# Patient Record
Sex: FEMALE | Race: WHITE | NOT HISPANIC OR LATINO | Employment: OTHER | URBAN - METROPOLITAN AREA
[De-identification: names, ages, dates, MRNs, and addresses within clinical notes are randomized per-mention and may not be internally consistent; named-entity substitution may affect disease eponyms.]

---

## 2017-01-19 ENCOUNTER — TELEPHONE (OUTPATIENT)
Dept: FAMILY MEDICINE | Facility: CLINIC | Age: 77
End: 2017-01-19

## 2017-01-19 RX ORDER — THEOPHYLLINE 200 MG/1
200 TABLET, EXTENDED RELEASE ORAL 2 TIMES DAILY
Qty: 60 TABLET | Refills: 11 | Status: SHIPPED | OUTPATIENT
Start: 2017-01-19 | End: 2017-04-10

## 2017-01-19 NOTE — TELEPHONE ENCOUNTER
Tried calling [facility] but was on hold over 10 mins.    The prescription that was escribed is for Theophylline 400 mg capsule and the directions say to take 1 twice a day???

## 2017-01-19 NOTE — TELEPHONE ENCOUNTER
----- Message from Denise Ye sent at 1/19/2017 12:20 PM CST -----  Contact: Ary  Needs to verify prescription for Theophylline. Please call back at

## 2017-02-20 ENCOUNTER — TELEPHONE (OUTPATIENT)
Dept: FAMILY MEDICINE | Facility: CLINIC | Age: 77
End: 2017-02-20

## 2017-02-20 NOTE — TELEPHONE ENCOUNTER
----- Message from Sergio Parmar sent at 2/20/2017 11:01 AM CST -----  Contact: Granddaughter- Renzo pena  States she gave grandmother a shower today and immediately afterwards she had to use restroom. States she had a good bit of bright red blood. Wants to be seen today in clinic by whoever is available for today. Call [phone].thanks!

## 2017-02-21 ENCOUNTER — DOCUMENTATION ONLY (OUTPATIENT)
Dept: FAMILY MEDICINE | Facility: CLINIC | Age: 77
End: 2017-02-21

## 2017-02-21 ENCOUNTER — TELEPHONE (OUTPATIENT)
Dept: FAMILY MEDICINE | Facility: CLINIC | Age: 77
End: 2017-02-21

## 2017-02-21 NOTE — PROGRESS NOTES
Pre-Visit Chart Review  For Appointment Scheduled on 2/22/17    Health Maintenance Due   Topic Date Due    TETANUS VACCINE  02/11/1957    DEXA SCAN  02/11/1979    Zoster Vaccine  02/11/1999    Influenza Vaccine  08/01/2016    Pneumococcal (65+) (2 of 2 - PCV13) 12/08/2016    Lipid Panel  12/08/2016

## 2017-02-21 NOTE — TELEPHONE ENCOUNTER
LM on patient VM notifying patient that appointment time with Nanette Casas on 2/22/17 is at 2:20 not 2:40. Patient is being seen for multi issues and has extensive medication list so longer appointment time was needed.

## 2017-03-30 ENCOUNTER — DOCUMENTATION ONLY (OUTPATIENT)
Dept: FAMILY MEDICINE | Facility: CLINIC | Age: 77
End: 2017-03-30

## 2017-03-30 NOTE — PROGRESS NOTES
Pre-Visit Chart Review  For Appointment Scheduled on 03/31/2017    Health Maintenance Due   Topic Date Due    TETANUS VACCINE  02/11/1957    DEXA SCAN  02/11/1979    Zoster Vaccine  02/11/1999    Influenza Vaccine  08/01/2016    Pneumococcal (65+) (2 of 2 - PCV13) 12/08/2016    Lipid Panel  12/08/2016

## 2017-03-31 ENCOUNTER — OFFICE VISIT (OUTPATIENT)
Dept: FAMILY MEDICINE | Facility: CLINIC | Age: 77
End: 2017-03-31
Payer: MEDICARE

## 2017-03-31 ENCOUNTER — LAB VISIT (OUTPATIENT)
Dept: LAB | Facility: HOSPITAL | Age: 77
End: 2017-03-31
Attending: FAMILY MEDICINE
Payer: MEDICARE

## 2017-03-31 VITALS
HEIGHT: 60 IN | SYSTOLIC BLOOD PRESSURE: 143 MMHG | WEIGHT: 190.69 LBS | DIASTOLIC BLOOD PRESSURE: 62 MMHG | BODY MASS INDEX: 37.44 KG/M2 | TEMPERATURE: 98 F | HEART RATE: 61 BPM

## 2017-03-31 DIAGNOSIS — E78.5 HYPERLIPIDEMIA, UNSPECIFIED HYPERLIPIDEMIA TYPE: ICD-10-CM

## 2017-03-31 DIAGNOSIS — R60.0 PERIPHERAL EDEMA: ICD-10-CM

## 2017-03-31 DIAGNOSIS — R60.0 PERIPHERAL EDEMA: Primary | ICD-10-CM

## 2017-03-31 DIAGNOSIS — I10 ESSENTIAL HYPERTENSION: ICD-10-CM

## 2017-03-31 LAB
ALBUMIN SERPL BCP-MCNC: 2.9 G/DL
ALP SERPL-CCNC: 91 U/L
ALT SERPL W/O P-5'-P-CCNC: 8 U/L
ANION GAP SERPL CALC-SCNC: 10 MMOL/L
AST SERPL-CCNC: 14 U/L
BASOPHILS # BLD AUTO: 0.03 K/UL
BASOPHILS NFR BLD: 0.3 %
BILIRUB SERPL-MCNC: 0.3 MG/DL
BNP SERPL-MCNC: 68 PG/ML
BUN SERPL-MCNC: 14 MG/DL
CALCIUM SERPL-MCNC: 8.7 MG/DL
CHLORIDE SERPL-SCNC: 105 MMOL/L
CHOLEST/HDLC SERPL: 3.9 {RATIO}
CO2 SERPL-SCNC: 23 MMOL/L
CREAT SERPL-MCNC: 1 MG/DL
DIFFERENTIAL METHOD: ABNORMAL
EOSINOPHIL # BLD AUTO: 0.5 K/UL
EOSINOPHIL NFR BLD: 4.7 %
ERYTHROCYTE [DISTWIDTH] IN BLOOD BY AUTOMATED COUNT: 13.4 %
EST. GFR  (AFRICAN AMERICAN): >60 ML/MIN/1.73 M^2
EST. GFR  (NON AFRICAN AMERICAN): 54.1 ML/MIN/1.73 M^2
GLUCOSE SERPL-MCNC: 146 MG/DL
HCT VFR BLD AUTO: 38 %
HDL/CHOLESTEROL RATIO: 25.5 %
HDLC SERPL-MCNC: 161 MG/DL
HDLC SERPL-MCNC: 41 MG/DL
HGB BLD-MCNC: 11.8 G/DL
LDLC SERPL CALC-MCNC: 92.6 MG/DL
LYMPHOCYTES # BLD AUTO: 2.2 K/UL
LYMPHOCYTES NFR BLD: 22.7 %
MCH RBC QN AUTO: 27.1 PG
MCHC RBC AUTO-ENTMCNC: 31.1 %
MCV RBC AUTO: 87 FL
MONOCYTES # BLD AUTO: 0.7 K/UL
MONOCYTES NFR BLD: 7.3 %
NEUTROPHILS # BLD AUTO: 6.4 K/UL
NEUTROPHILS NFR BLD: 64.7 %
NONHDLC SERPL-MCNC: 120 MG/DL
PLATELET # BLD AUTO: 299 K/UL
PMV BLD AUTO: 11.4 FL
POTASSIUM SERPL-SCNC: 4.3 MMOL/L
PROT SERPL-MCNC: 7.1 G/DL
RBC # BLD AUTO: 4.36 M/UL
SODIUM SERPL-SCNC: 138 MMOL/L
TRIGL SERPL-MCNC: 137 MG/DL
TSH SERPL DL<=0.005 MIU/L-ACNC: 3.94 UIU/ML
WBC # BLD AUTO: 9.82 K/UL

## 2017-03-31 PROCEDURE — 99213 OFFICE O/P EST LOW 20 MIN: CPT | Mod: S$PBB,,, | Performed by: PHYSICIAN ASSISTANT

## 2017-03-31 PROCEDURE — 84443 ASSAY THYROID STIM HORMONE: CPT

## 2017-03-31 PROCEDURE — 36415 COLL VENOUS BLD VENIPUNCTURE: CPT | Mod: PO

## 2017-03-31 PROCEDURE — 83880 ASSAY OF NATRIURETIC PEPTIDE: CPT

## 2017-03-31 PROCEDURE — 99999 PR PBB SHADOW E&M-EST. PATIENT-LVL IV: CPT | Mod: PBBFAC,,, | Performed by: PHYSICIAN ASSISTANT

## 2017-03-31 PROCEDURE — 80053 COMPREHEN METABOLIC PANEL: CPT

## 2017-03-31 PROCEDURE — 85025 COMPLETE CBC W/AUTO DIFF WBC: CPT

## 2017-03-31 PROCEDURE — 80061 LIPID PANEL: CPT

## 2017-03-31 RX ORDER — HYDROCODONE BITARTRATE AND ACETAMINOPHEN 5; 325 MG/1; MG/1
TABLET ORAL
Refills: 0 | COMMUNITY
Start: 2017-01-03 | End: 2017-06-08 | Stop reason: ALTCHOICE

## 2017-03-31 RX ORDER — HYDROCHLOROTHIAZIDE 12.5 MG/1
TABLET ORAL
Qty: 90 TABLET | Refills: 3 | Status: SHIPPED | OUTPATIENT
Start: 2017-03-31 | End: 2017-06-08 | Stop reason: ALTCHOICE

## 2017-03-31 RX ORDER — HYDROCHLOROTHIAZIDE 12.5 MG/1
12.5 TABLET ORAL DAILY
Qty: 30 TABLET | Refills: 11 | Status: SHIPPED | OUTPATIENT
Start: 2017-03-31 | End: 2017-03-31 | Stop reason: SDUPTHER

## 2017-03-31 NOTE — MR AVS SNAPSHOT
[facility]  [address]  [place] [address]  Phone: [phone]  Fax: [phone]                  Michelle Kovacs   3/31/2017 3:00 PM   Office Visit    Description:  Female : 1940   Provider:  RUFINA Ibarra   Department:  [facility]           Diagnoses this Visit        Comments    Peripheral edema    -  Primary     Essential hypertension         Hyperlipidemia, unspecified hyperlipidemia type                To Do List           Goals (5 Years of Data)     None       These Medications        Disp Refills Start End    hydrochlorothiazide (HYDRODIURIL) 12.5 MG Tab 30 tablet 11 3/31/2017 3/31/2018    Take 1 tablet (12.5 mg total) by mouth once daily. - Oral    Pharmacy: [id number]  [place] - [address] AT [facility] [address] Ph #: [phone]         [facility] On Call     [facility] On Call Nurse Care Line -  Assistance  Unless otherwise directed by your provider, please contact [facility], our nurse care line that is available for  assistance.     Registered nurses in the [facility] On Call Center provide: appointment scheduling, clinical advisement, health education, and other advisory services.  Call: [phone] (toll free)               Medications           Message regarding Medications     Verify the changes and/or additions to your medication regime listed below are the same as discussed with your clinician today.  If any of these changes or additions are incorrect, please notify your healthcare provider.        START taking these NEW medications        Refills    hydrochlorothiazide (HYDRODIURIL) 12.5 MG Tab 11    Sig: Take 1 tablet (12.5 mg total) by mouth once daily.    Class: Normal    Route: Oral           Verify that the below list of medications is an accurate representation of the medications you are currently taking.  If none reported, the list may be blank. If incorrect, please  contact your healthcare provider. Carry this list with you in case of emergency.           Current Medications     alprazolam (XANAX) 0.25 MG tablet Take 0.25 mg by mouth 2 (two) times daily as needed.     amlodipine (NORVASC) 5 MG tablet TAKE ONE TABLET BY MOUTH ONCE DAILY    aspirin (ECOTRIN) 81 MG EC tablet Take 81 mg by mouth once daily.    citalopram (CELEXA) 40 MG tablet Take 40 mg by mouth once daily.      donepezil (ARICEPT) 5 MG tablet Take 5 mg by mouth every evening.    ELIQUIS 5 mg Tab Take 1 tablet by mouth 2 (two) times daily.    fish oil-omega-3 fatty acids 300-1,000 mg capsule Take 1,200 mg by mouth once daily.     loratadine (CLARITIN) 10 mg tablet Take 10 mg by mouth once daily.    losartan (COZAAR) 50 MG tablet TAKE 1 TABLET BY MOUTH EVERY DAY    memantine (NAMENDA) 10 MG Tab Take 1 tablet (10 mg total) by mouth 2 (two) times daily.    metoprolol succinate (TOPROL-XL) 25 MG 24 hr tablet TAKE 1 TABLET BY MOUTH DAILY    omeprazole (PRILOSEC OTC) 20 MG tablet Every day    pravastatin (PRAVACHOL) 40 MG tablet TAKE 1 TABLET(40 MG) BY MOUTH EVERY EVENING    theophylline (THEODUR) 200 MG 12 hr tablet Take 1 tablet (200 mg total) by mouth 2 (two) times daily.    zafirlukast (ACCOLATE) 20 MG tablet Take 1 tablet (20 mg total) by mouth 2 (two) times daily.    hydrochlorothiazide (HYDRODIURIL) 12.5 MG Tab Take 1 tablet (12.5 mg total) by mouth once daily.    hydrocodone-acetaminophen 5-325mg (NORCO) 5-325 mg per tablet TAKE 1 TABLET EVERY 4-6 HOURS AS NEEDED FOR PAIN,DO NOT EXCEED 6 TAB IN 24 HOURS           Clinical Reference Information           Your Vitals Were     BP Pulse Temp    143/62 (BP Location: Left arm, Patient Position: Sitting, BP Method: Automatic) 61 97.8 °F (36.6 °C) (Tympanic)    Height Weight BMI    5' (1.524 m) 86.5 kg (190 lb 11.2 oz) 37.24 kg/m2      Blood Pressure          Most Recent Value    BP  (!)  143/62      Allergies as of 3/31/2017     No Known Drug Allergies       Immunizations Administered on Date of Encounter - 3/31/2017     None      Orders Placed During Today's Visit     Future Labs/Procedures Expected by Expires    Brain natriuretic peptide  3/31/2017 5/30/2018    CBC auto differential  3/31/2017 3/31/2018    Comprehensive metabolic panel  3/31/2017 5/30/2018    Lipid panel  3/31/2017 5/30/2018    TSH  3/31/2017 5/30/2018      Language Assistance Services     ATTENTION: Language assistance services are available, free of charge. Please call [phone].      ATENCIÓN: Si habla español, tiene a branham disposición servicios gratuitos de asistencia lingüística. Llame al [phone].     CHÚ Ý: N?u b?n nói Ti?ng Vi?t, có các d?ch v? h? tr? ngôn ng? mi?n phí dành cho b?n. G?i s? [phone].         [facility] complies with applicable Federal civil rights laws and does not discriminate on the basis of race, color, national origin, age, disability, or sex.

## 2017-04-01 DIAGNOSIS — Z76.0 ENCOUNTER FOR MEDICATION REFILL: ICD-10-CM

## 2017-04-01 DIAGNOSIS — E78.5 HYPERLIPIDEMIA: ICD-10-CM

## 2017-04-01 RX ORDER — ZAFIRLUKAST 20 MG/1
TABLET, FILM COATED ORAL
Qty: 180 TABLET | Refills: 3 | Status: SHIPPED | OUTPATIENT
Start: 2017-04-01

## 2017-04-03 ENCOUNTER — TELEPHONE (OUTPATIENT)
Dept: FAMILY MEDICINE | Facility: CLINIC | Age: 77
End: 2017-04-03

## 2017-04-03 RX ORDER — PRAVASTATIN SODIUM 40 MG/1
TABLET ORAL
Qty: 90 TABLET | Refills: 0 | Status: SHIPPED | OUTPATIENT
Start: 2017-04-03 | End: 2017-07-03 | Stop reason: SDUPTHER

## 2017-04-03 NOTE — PROGRESS NOTES
Subjective:       Patient ID: Michelle Kovacs is a 77 y.o. female.    Chief Complaint: Edema    HPI Comments: Patient presents accompanied by her 2 daughters who report that patient has worsening lower extremity edema for  The past 6 months.  Patient has advanced dementia so is not able to answer my questions appropriately.  Her daughter has noticed a slight dry cough.  Daughter reports echocardiogram 6 months ago with cardiology was normal     Edema   Associated symptoms include congestion and coughing. Pertinent negatives include no abdominal pain, chest pain, fatigue, headaches or nausea.     Review of Systems   Constitutional: Negative for appetite change, fatigue and unexpected weight change.   HENT: Positive for congestion.    Respiratory: Positive for cough. Negative for chest tightness, shortness of breath and wheezing.    Cardiovascular: Positive for leg swelling. Negative for chest pain and palpitations.   Gastrointestinal: Negative for abdominal pain, diarrhea and nausea.   Genitourinary: Negative for decreased urine volume.   Neurological: Negative for dizziness, light-headedness and headaches.       Objective:      Physical Exam   Constitutional: She appears well-developed and well-nourished. She is cooperative. No distress.   Eyes: Conjunctivae and EOM are normal. Pupils are equal, round, and reactive to light. No scleral icterus.   Cardiovascular: Normal rate, regular rhythm and normal heart sounds.    Pulmonary/Chest: Effort normal and breath sounds normal.   Abdominal: Soft. Bowel sounds are normal.   Musculoskeletal:        Right hand: She exhibits normal capillary refill.        Left hand: She exhibits normal capillary refill.        Right lower leg: She exhibits edema (2+).        Left lower leg: She exhibits edema (2+).   Neurological: She is alert.   Skin: Skin is warm and dry.   Psychiatric: She has a normal mood and affect. Her speech is normal. Judgment normal. Cognition and memory  are normal.   Vitals reviewed.      Assessment:       1. Peripheral edema    2. Essential hypertension    3. Hyperlipidemia, unspecified hyperlipidemia type    4. BMI 37.0-37.9, adult        Plan:       Michelle was seen today for edema.    Diagnoses and all orders for this visit:    Peripheral edema  -     Comprehensive metabolic panel; Future  -     TSH; Future  -     CBC auto differential; Future  -     Brain natriuretic peptide; Future  Further recommendations will be made based on results     -  Essential hypertension  Start hctz 12.5 mg qd   Hyperlipidemia, unspecified hyperlipidemia type  -     Lipid panel; Future  Patient readiness: acceptance and barriers:none    During the course of the visit the patient was educated and counseled about the following:     Hypertension:   Medication: start hctz.  Obesity:   Reduce calories     Goals: Hypertension: Reduce Blood Pressure and Obesity: Reduce calorie intake and BMI    Did patient meet goals/outcomes: No    The following self management tools provided: blood pressure log    Patient Instructions (the written plan) was given to the patient/family.     Time spent with patient: 30 minutes

## 2017-04-03 NOTE — TELEPHONE ENCOUNTER
----- Message from RUFINA Ibarra sent at 4/3/2017  3:12 PM CDT -----  Only lab abnormality to account for the swelling is the low albumin   Her kidney function is looking better than it had been   BNP (hear failure marker ) is normal   Continue with HCTZ, compression stocking recommended   Follow up with PCP is needed (3-6 mo)

## 2017-04-10 ENCOUNTER — TELEPHONE (OUTPATIENT)
Dept: FAMILY MEDICINE | Facility: CLINIC | Age: 77
End: 2017-04-10

## 2017-04-10 RX ORDER — THEOPHYLLINE 400 MG/1
200 TABLET, EXTENDED RELEASE ORAL 2 TIMES DAILY
Qty: 15 TABLET | Refills: 11 | Status: SHIPPED | OUTPATIENT
Start: 2017-04-10 | End: 2017-04-10 | Stop reason: SDUPTHER

## 2017-04-10 NOTE — TELEPHONE ENCOUNTER
Left voice message for patient to call back and let us know the name of the medication that she needs refilled.

## 2017-04-10 NOTE — TELEPHONE ENCOUNTER
Patient takes Theophyline 200 mg BID. The 200 mg tab is no longer made. Needs a new rx for theophyline 400 mg tab, take 1/2 tab BID. Thought itt was sent at last visit. Patient is out of med. Please send to [facility] in [place] on [address]

## 2017-04-10 NOTE — TELEPHONE ENCOUNTER
----- Message from Denise Ye sent at 4/10/2017  9:32 AM CDT -----  Contact: daughter  States one of the drugs prescribed was not called in-asthma medicine.  Please call back at     [id number] - [place] - [address] [facility] [address]  [address] [facility] [address]  [place] [address]  Phone: [phone] Fax: [phone]

## 2017-04-11 RX ORDER — THEOPHYLLINE 400 MG/1
TABLET, EXTENDED RELEASE ORAL
Qty: 90 TABLET | Refills: 11 | Status: SHIPPED | OUTPATIENT
Start: 2017-04-11

## 2017-06-08 ENCOUNTER — TELEPHONE (OUTPATIENT)
Dept: FAMILY MEDICINE | Facility: CLINIC | Age: 77
End: 2017-06-08

## 2017-06-08 ENCOUNTER — DOCUMENTATION ONLY (OUTPATIENT)
Dept: FAMILY MEDICINE | Facility: CLINIC | Age: 77
End: 2017-06-08

## 2017-06-08 ENCOUNTER — OFFICE VISIT (OUTPATIENT)
Dept: FAMILY MEDICINE | Facility: CLINIC | Age: 77
End: 2017-06-08
Payer: MEDICARE

## 2017-06-08 VITALS
TEMPERATURE: 98 F | BODY MASS INDEX: 37.83 KG/M2 | RESPIRATION RATE: 20 BRPM | HEART RATE: 65 BPM | DIASTOLIC BLOOD PRESSURE: 69 MMHG | OXYGEN SATURATION: 92 % | SYSTOLIC BLOOD PRESSURE: 150 MMHG | HEIGHT: 60 IN | WEIGHT: 192.69 LBS

## 2017-06-08 DIAGNOSIS — J45.20 MILD INTERMITTENT ASTHMA WITHOUT COMPLICATION: ICD-10-CM

## 2017-06-08 DIAGNOSIS — Z76.0 ENCOUNTER FOR MEDICATION REFILL: ICD-10-CM

## 2017-06-08 DIAGNOSIS — F02.80 ALZHEIMER'S DEMENTIA WITHOUT BEHAVIORAL DISTURBANCE, UNSPECIFIED TIMING OF DEMENTIA ONSET: ICD-10-CM

## 2017-06-08 DIAGNOSIS — G30.9 ALZHEIMER'S DEMENTIA WITHOUT BEHAVIORAL DISTURBANCE, UNSPECIFIED TIMING OF DEMENTIA ONSET: ICD-10-CM

## 2017-06-08 DIAGNOSIS — J45.20 MILD INTERMITTENT ASTHMA WITHOUT COMPLICATION: Primary | ICD-10-CM

## 2017-06-08 DIAGNOSIS — B86 SCABIES: ICD-10-CM

## 2017-06-08 PROCEDURE — 99214 OFFICE O/P EST MOD 30 MIN: CPT | Mod: S$PBB,,, | Performed by: FAMILY MEDICINE

## 2017-06-08 PROCEDURE — 1159F MED LIST DOCD IN RCRD: CPT | Mod: ,,, | Performed by: FAMILY MEDICINE

## 2017-06-08 PROCEDURE — 99213 OFFICE O/P EST LOW 20 MIN: CPT | Mod: PBBFAC,PO | Performed by: FAMILY MEDICINE

## 2017-06-08 PROCEDURE — 99999 PR PBB SHADOW E&M-EST. PATIENT-LVL III: CPT | Mod: PBBFAC,,, | Performed by: FAMILY MEDICINE

## 2017-06-08 PROCEDURE — 1126F AMNT PAIN NOTED NONE PRSNT: CPT | Mod: ,,, | Performed by: FAMILY MEDICINE

## 2017-06-08 PROCEDURE — 1157F ADVNC CARE PLAN IN RCRD: CPT | Mod: ,,, | Performed by: FAMILY MEDICINE

## 2017-06-08 RX ORDER — POTASSIUM CHLORIDE 750 MG/1
CAPSULE, EXTENDED RELEASE ORAL
Refills: 3 | COMMUNITY
Start: 2017-05-22

## 2017-06-08 RX ORDER — FUROSEMIDE 20 MG/1
TABLET ORAL
Refills: 3 | COMMUNITY
Start: 2017-05-22

## 2017-06-08 RX ORDER — MONTELUKAST SODIUM 10 MG/1
TABLET ORAL
Qty: 90 TABLET | Refills: 11 | OUTPATIENT
Start: 2017-06-08

## 2017-06-08 RX ORDER — PREDNISONE 20 MG/1
20 TABLET ORAL DAILY
Qty: 5 TABLET | Refills: 2 | Status: SHIPPED | OUTPATIENT
Start: 2017-06-08 | End: 2017-08-18

## 2017-06-08 RX ORDER — MONTELUKAST SODIUM 10 MG/1
10 TABLET ORAL NIGHTLY
Qty: 30 TABLET | Refills: 11 | Status: SHIPPED | OUTPATIENT
Start: 2017-06-08

## 2017-06-08 RX ORDER — IVERMECTIN 5 MG/G
1 LOTION TOPICAL ONCE
Qty: 117 G | Refills: 1 | Status: SHIPPED | OUTPATIENT
Start: 2017-06-08 | End: 2017-06-08

## 2017-06-08 NOTE — TELEPHONE ENCOUNTER
----- Message from Geraldine Werner sent at 6/8/2017  2:19 PM CDT -----  Patient is requesting a Rx for scabies please call [phone]    [id number] - [place] - [address] AT [facility] [address]  [address]  [place] [address]  Phone: [phone] Fax: [phone]

## 2017-06-08 NOTE — PROGRESS NOTES
Pre-Visit Chart Review  For Appointment Scheduled on 6-8-17    Health Maintenance Due   Topic Date Due    TETANUS VACCINE  02/11/1957    DEXA SCAN  02/11/1979    Zoster Vaccine  02/11/1999    Pneumococcal (65+) (2 of 2 - PCV13) 12/08/2016

## 2017-06-09 NOTE — PROGRESS NOTES
Subjective:       Patient ID: Michelle Kovacs is a 77 y.o. female.    Chief Complaint: Wheezing (exposed to scabies )    77 year old female patient of Dr. Costello presents for evaluation of scabies.  She has no rash but was indirectly and perhaps directly contacted by her caretaker's  who did.  The patient has alzheimers dementia and often overnight stays with her caretaker and the  assists with her frequently.  He was recently found to have scabies and treated, his wife without lesions was treated, and both had contact with the patient who does not currently have a rash but may potentially be infected and reinfect the other two.  In addition she has asthma with occasional flareups previously treated by Dr. Harris.  She would generally receive a short course of prednisone for flareups along with inhalers but as her alzheimers progressed she has been unable to use the inhalers due to loss of cognitive ability.    Past Medical History:  No date: Allergy  No date: Alzheimer disease  No date: Anxiety  No date: Asthma  No date: Depression  No date: Diverticulitis  No date: GERD (gastroesophageal reflux disease)  No date: Glaucoma  No date: Hypertension    Past Surgical History:  No date: HYSTERECTOMY      Comment: Total  No date: TONSILLECTOMY      Current Outpatient Prescriptions:     alprazolam (XANAX) 0.25 MG tablet, Take 0.25 mg by mouth 2 (two) times daily as needed. , Disp: , Rfl:     aspirin (ECOTRIN) 81 MG EC tablet, Take 81 mg by mouth once daily., Disp: , Rfl:     citalopram (CELEXA) 40 MG tablet, Take 40 mg by mouth once daily.  , Disp: , Rfl:     ELIQUIS 5 mg Tab, Take 1 tablet by mouth 2 (two) times daily., Disp: , Rfl: 3    fish oil-omega-3 fatty acids 300-1,000 mg capsule, Take 1,200 mg by mouth once daily. , Disp: , Rfl:     furosemide (LASIX) 20 MG tablet, TK 1 T PO QD  PRF SWELLING, Disp: , Rfl: 3    loratadine (CLARITIN) 10 mg tablet, Take 10 mg by mouth once daily., Disp:  , Rfl:     losartan (COZAAR) 50 MG tablet, TAKE 1 TABLET BY MOUTH EVERY DAY, Disp: 90 tablet, Rfl: 3    memantine (NAMENDA) 10 MG Tab, Take 1 tablet (10 mg total) by mouth 2 (two) times daily., Disp: 180 tablet, Rfl: 3    metoprolol succinate (TOPROL-XL) 25 MG 24 hr tablet, TAKE 1 TABLET BY MOUTH DAILY, Disp: 90 tablet, Rfl: 0    omeprazole (PRILOSEC OTC) 20 MG tablet, Take 20 mg by mouth every morning. Every day, Disp: , Rfl:     potassium chloride (MICRO-K) 10 MEQ CpSR, TK 1 C PO ON THE DAYS YOU TAKE  LASIX, Disp: , Rfl: 3    pravastatin (PRAVACHOL) 40 MG tablet, TAKE 1 TABLET(40 MG) BY MOUTH EVERY NIGHT, Disp: 90 tablet, Rfl: 0    theophylline 400 mg Tb24, TAKE 1/2 TABLET BY MOUTH TWICE DAILY, Disp: 90 tablet, Rfl: 11    zafirlukast (ACCOLATE) 20 MG tablet, TAKE 1 TABLET BY MOUTH TWICE DAILY, Disp: 180 tablet, Rfl: 3        Review of Systems   Unable to perform ROS: Dementia   Constitutional: Negative for chills and fever.   HENT: Positive for congestion, postnasal drip and rhinorrhea. Negative for sinus pressure.    Respiratory: Positive for cough and wheezing (occasional). Negative for chest tightness and shortness of breath.        Objective:      Physical Exam   Constitutional: She appears well-developed. No distress.   Mildly elevated blood pressure     HENT:   Head: Normocephalic and atraumatic.   Right Ear: External ear normal.   Left Ear: External ear normal.   Nose: Mucosal edema and rhinorrhea present. Right sinus exhibits no maxillary sinus tenderness and no frontal sinus tenderness. Left sinus exhibits no maxillary sinus tenderness and no frontal sinus tenderness.   Mouth/Throat: Oropharynx is clear and moist.   Eyes: EOM are normal. Pupils are equal, round, and reactive to light. No scleral icterus.   Neck: Normal range of motion. Neck supple.   Cardiovascular: Normal rate, regular rhythm and normal heart sounds.  Exam reveals no gallop and no friction rub.    No murmur  heard.  Pulmonary/Chest: Effort normal and breath sounds normal. No respiratory distress. She has no wheezes. She has no rales. She exhibits no tenderness.   Skin: Skin is warm and dry. No rash noted. She is not diaphoretic. No erythema.   Nursing note and vitals reviewed.      Assessment:       1. Mild intermittent asthma without complication    2. Encounter for medication refill    3. Scabies    4. Alzheimer's dementia without behavioral disturbance, unspecified timing of dementia onset        Plan:       1. Mild intermittent asthma without complication  Will refill prn prednisone, trial singulair  - predniSONE (DELTASONE) 20 MG tablet; Take 1 tablet (20 mg total) by mouth once daily.  Dispense: 5 tablet; Refill: 2  - montelukast (SINGULAIR) 10 mg tablet; Take 1 tablet (10 mg total) by mouth every evening.  Dispense: 30 tablet; Refill: 11    2. Encounter for medication refill    3. Scabies  No visible rash but some contact and possibility of spread to others.  Will treat with single course of ivermectin  - ivermectin 0.5 % Lotn; Apply 1 application topically once.  Dispense: 117 g; Refill: 1    4. Alzheimer's dementia without behavioral disturbance, unspecified timing of dementia onset  stable

## 2017-06-13 ENCOUNTER — TELEPHONE (OUTPATIENT)
Dept: FAMILY MEDICINE | Facility: CLINIC | Age: 77
End: 2017-06-13

## 2017-06-13 DIAGNOSIS — Z20.7 SCABIES EXPOSURE: Primary | ICD-10-CM

## 2017-06-13 RX ORDER — PERMETHRIN 50 MG/G
CREAM TOPICAL
Qty: 60 G | Refills: 0 | Status: SHIPPED | OUTPATIENT
Start: 2017-06-13

## 2017-06-13 NOTE — TELEPHONE ENCOUNTER
----- Message from Lili Ford sent at 6/13/2017  3:29 PM CDT -----  Patients daughter states that Rx Sclice is too expensive and need Rx Permethrin sent into [facility]/[address].  Any questions call Soraida at [phone].

## 2017-07-03 DIAGNOSIS — E78.5 HYPERLIPIDEMIA: ICD-10-CM

## 2017-07-03 RX ORDER — PRAVASTATIN SODIUM 40 MG/1
TABLET ORAL
Qty: 90 TABLET | Refills: 0 | Status: SHIPPED | OUTPATIENT
Start: 2017-07-03

## 2017-07-31 ENCOUNTER — DOCUMENTATION ONLY (OUTPATIENT)
Dept: FAMILY MEDICINE | Facility: CLINIC | Age: 77
End: 2017-07-31

## 2017-07-31 NOTE — PROGRESS NOTES
Pre-Visit Chart Review  For Appointment Scheduled on 8/1/17    Health Maintenance Due   Topic Date Due    TETANUS VACCINE  02/11/1957    DEXA SCAN  02/11/1979    Zoster Vaccine  02/11/1999    Pneumococcal (65+) (2 of 2 - PCV13) 12/08/2016                 +

## 2017-08-01 ENCOUNTER — OFFICE VISIT (OUTPATIENT)
Dept: FAMILY MEDICINE | Facility: CLINIC | Age: 77
End: 2017-08-01
Payer: MEDICARE

## 2017-08-01 ENCOUNTER — TELEPHONE (OUTPATIENT)
Dept: FAMILY MEDICINE | Facility: CLINIC | Age: 77
End: 2017-08-01

## 2017-08-01 VITALS
HEIGHT: 60 IN | OXYGEN SATURATION: 95 % | DIASTOLIC BLOOD PRESSURE: 70 MMHG | WEIGHT: 191.56 LBS | RESPIRATION RATE: 14 BRPM | SYSTOLIC BLOOD PRESSURE: 140 MMHG | TEMPERATURE: 98 F | BODY MASS INDEX: 37.61 KG/M2 | HEART RATE: 67 BPM

## 2017-08-01 DIAGNOSIS — R05.9 COUGH: Primary | ICD-10-CM

## 2017-08-01 DIAGNOSIS — J01.00 ACUTE NON-RECURRENT MAXILLARY SINUSITIS: ICD-10-CM

## 2017-08-01 PROCEDURE — 1159F MED LIST DOCD IN RCRD: CPT | Mod: ,,, | Performed by: PHYSICIAN ASSISTANT

## 2017-08-01 PROCEDURE — 99999 PR PBB SHADOW E&M-EST. PATIENT-LVL V: CPT | Mod: PBBFAC,,, | Performed by: PHYSICIAN ASSISTANT

## 2017-08-01 PROCEDURE — 1126F AMNT PAIN NOTED NONE PRSNT: CPT | Mod: ,,, | Performed by: PHYSICIAN ASSISTANT

## 2017-08-01 PROCEDURE — 99215 OFFICE O/P EST HI 40 MIN: CPT | Mod: PBBFAC,PO | Performed by: PHYSICIAN ASSISTANT

## 2017-08-01 PROCEDURE — 99213 OFFICE O/P EST LOW 20 MIN: CPT | Mod: S$PBB,,, | Performed by: PHYSICIAN ASSISTANT

## 2017-08-01 PROCEDURE — 1157F ADVNC CARE PLAN IN RCRD: CPT | Mod: ,,, | Performed by: PHYSICIAN ASSISTANT

## 2017-08-01 RX ORDER — AMOXICILLIN AND CLAVULANATE POTASSIUM 875; 125 MG/1; MG/1
1 TABLET, FILM COATED ORAL 2 TIMES DAILY
Qty: 20 TABLET | Refills: 0 | Status: SHIPPED | OUTPATIENT
Start: 2017-08-01 | End: 2017-08-11

## 2017-08-01 RX ORDER — BENZONATATE 100 MG/1
100 CAPSULE ORAL 3 TIMES DAILY PRN
Qty: 30 CAPSULE | Refills: 0 | Status: SHIPPED | OUTPATIENT
Start: 2017-08-01

## 2017-08-01 NOTE — TELEPHONE ENCOUNTER
Appointment scheduled with patient's daughter for Monday, 3/5/17 at 9:00 am and appt letter mailed.

## 2017-08-01 NOTE — TELEPHONE ENCOUNTER
----- Message from Gladis Pavon sent at 8/1/2017  8:41 AM CDT -----  Pt has not seen dr awad since before 2012  Will dr awad still be her pcp ?  Please call daughter shamir @ [phone]  Thanks !

## 2017-08-01 NOTE — PATIENT INSTRUCTIONS
Take antibiotics with food.  Increase fluid intake.  Call the clinic if symptoms worsen, new symptoms develop or if you are not any better after completion of your antibiotics.        Established High Blood Pressure    High blood pressure (hypertension) is a chronic disease. Often health care providers dont know what causes it. But it can be caused by certain health conditions and medicines.  If you have high blood pressure, you may not have any symptoms. If you do have symptoms, they may include headache, dizziness, changes in your vision, chest pain, and shortness of breath. But even without symptoms, high blood pressure thats not treated raises your risk for heart attack and stroke. High blood pressure is a serious health risk and shouldnt be ignored.  A blood pressure reading is made up of two numbers: a higher number over a lower number. The top number is the systolic pressure. The bottom number is the diastolic pressure. A normal blood pressure is less than 120 over less than 80.  High blood pressure is when either the top number is 140 or higher, or the bottom number is 90 or higher. This must be the result when taking your blood pressure a number of times. The blood pressures between normal and high are called prehypertension.  Home care  If you have high blood pressure, you should do what is listed below to lower your blood pressure. If you are taking medicines for high blood pressure, these methods may reduce or end your need for medicines in the future.  · Begin a weight-loss program if you are overweight.  · Cut back on how much salt you get in your diet. Heres how to do this:  ¨ Dont eat foods that have a lot of salt. These include olives, pickles, smoked meats, and salted potato chips.  ¨ Dont add salt to your food at the table.  ¨ Use only small amounts of salt when cooking.  · Begin an exercise program. Talk with your health care provider about the type of exercise program that would be best  for you. It doesn't have to be hard. Even brisk walking for 20 minutes 3 times a week is a good form of exercise.  · Dont take medicines that have heart stimulants. This includes many cold and sinus decongestant pills and sprays, as well as diet pills. Check the warnings about hypertension on the label. Stimulants such as amphetamine or cocaine could be lethal for someone with high blood pressure. Never take these.  · Limit how much caffeine you get in your diet. Switch to caffeine-free products.  · Stop smoking. If you are a long-time smoker, this can be hard. Enroll in a stop-smoking program to make it more likely that you will quit for good.  · Learn how to handle stress. This is an important part of any program to lower blood pressure. Learn about relaxation methods like meditation, yoga, or biofeedback.  · If your provider prescribed medicines, take them exactly as directed. Missing doses may cause your blood pressure get out of control.  · Consider buying an automatic blood pressure machine. You can get one of these at most pharmacies. Use this to watch your blood pressure at home. Give the results to your provider.  Follow-up care  You will need to make regular visits to your health care provider. This is to check your blood pressure and to make changes to your medicines. Make a follow-up appointment as directed.  When to seek medical advice  Call your health care provider right away if any of these occur:  · Chest pain or shortness of breath  · Severe headache  · Throbbing or rushing sound in the ears  · Nosebleed  · Sudden severe pain in your belly (abdomen)  · Extreme drowsiness, confusion, or fainting  · Dizziness or dizziness with a spinning sensation (vertigo)  · Weakness of an arm or leg or one side of the face  · You have problems speaking or seeing   Date Last Reviewed: 11/25/2014  © 3766-0472 [employer]. [address] [place] [address], [place] [address]. All rights reserved. This information  is not intended as a substitute for professional medical care. Always follow your healthcare professional's instructions.            Walking for Fitness  Fitness walking has something for everyone, even people who are already fit. Walking is one of the safest ways to condition your body aerobically. It can boost energy, help you lose weight, and reduce stress.    Physical benefits  · Walking strengthens your heart and lungs, and tones your muscles.  · When walking, your feet land with less impact than in other sports. This reduces chances of muscle, bone, and joint injury.  · Regular walking improves your cholesterol levels and lowers your risk of heart disease. And it helps you control your blood sugar if you have diabetes.  · Walking is a weight-bearing activity, which helps maintain bone density. This can help prevent osteoporosis.  Personal rewards  · Taking walks can help you relax and manage stress. And fitness walking may make you feel better about yourself.  · Walking can help you sleep better at night and make you less likely to be depressed.  · Regular walking may help maintain your memory as you get older.  · Walking is a great way to spend extra time with friends and family members. Be sure to invite your dog along!  Q&A about fitness walking  Q: Will walking keep me fit?  A: Yes. Regular walking at the right pace gives you all the benefits of other aerobic activities, such as jogging and swimming.  Q: Will walking help me lose weight and keep it off?  A: Yes. Per mile, walking can burn as many calories as jogging. Your health care provider can help work walking into your weight-loss plan.  Q: Is walking safe for my health?  A: Yes. Walking is safe if you have high blood pressure, diabetes, heart disease, or other conditions. Talk to your health care provider before you start.  Date Last Reviewed: 5/9/2015  © 9529-5566 [employer]. [address] [place] [address], [place] [address]. All rights  reserved. This information is not intended as a substitute for professional medical care. Always follow your healthcare professional's instructions.            Weight Management: Getting Started  Healthy bodies come in all shapes and sizes. Not all bodies are made to be thin. For some people, a healthy weight is higher than the average weight listed on weight charts. Your healthcare provider can help you decide on a healthy weight for you.    Reasons to lose weight  Losing weight can help with some health problems, such as high blood pressure, heart disease, diabetes, sleep apnea, and arthritis. You may also feel more energy.  Set your long-term goal  Your goal doesn't even have to be a specific weight. You may decide on a fitness goal (such as being able to walk 10 miles a week), or a health goal (such as lowering your blood pressure). Choose a goal that is measurable and reasonable, so you know when you've reached it. A goal of reaching a BMI of less than 25 is not always reasonable (or possible).   Make an action plan  Habits dont change overnight. Setting your goals too high can leave you feeling discouraged if you cant reach them. Be realistic. Choose one or two small changes you can make now. Set an action plan for how you are going to make these changes. When you can stick to this plan, keep making a few more small changes. Taking small steps will help you stay on the path to success.  Track your progress  Write down your goals. Then, keep a daily record of your progress. Write down what you eat and how active you are. This record lets you look back on how much youve done. It may also help when youre feeling frustrated. Reward yourself for success. Even if you dont reach every goal, give yourself credit for what you do get done.  Get support  Encouragement from others can help make losing weight easier. Ask your family members and friends for support. They may even want to join you. Also look to your  healthcare provider, registered dietitian, and  for help. Your local hospital can give you more information about nutrition, exercise, and weight loss.  Date Last Reviewed: 1/31/2016  © 1355-5970 [employer]. [address] [place] [address], [place] [address]. All rights reserved. This information is not intended as a substitute for professional medical care. Always follow your healthcare professional's instructions.

## 2017-08-01 NOTE — PROGRESS NOTES
Subjective:       Patient ID: Michelle Kovacs is a 77 y.o. female.    Chief Complaint: Cough    Cough   This is a new problem. The current episode started 1 to 4 weeks ago. The problem has been gradually worsening. The cough is productive of purulent sputum. Associated symptoms include nasal congestion, postnasal drip, rhinorrhea and a sore throat. Pertinent negatives include no chest pain, chills, ear pain, fever, headaches, heartburn, shortness of breath, weight loss or wheezing. Nothing aggravates the symptoms. Treatments tried: prednisone x 5 days. The treatment provided no relief. Her past medical history is significant for asthma.   Sinusitis   This is a new problem. The current episode started 1 to 4 weeks ago. The problem has been gradually worsening since onset. There has been no fever. The pain is moderate. Associated symptoms include congestion, coughing, sinus pressure and a sore throat. Pertinent negatives include no chills, ear pain, headaches or shortness of breath. Treatments tried: prednisone x 5 days. The treatment provided no relief.     Review of Systems   Constitutional: Negative for activity change, appetite change, chills, fever and weight loss.   HENT: Positive for congestion, postnasal drip, rhinorrhea, sinus pressure and sore throat. Negative for ear pain.    Eyes: Negative for visual disturbance.   Respiratory: Positive for cough. Negative for shortness of breath and wheezing.    Cardiovascular: Negative for chest pain.   Gastrointestinal: Negative for abdominal distention, abdominal pain and heartburn.   Genitourinary: Negative for difficulty urinating.   Neurological: Negative for headaches.   Hematological: Negative for adenopathy.   Psychiatric/Behavioral: The patient is not nervous/anxious.        Objective:      Physical Exam   HENT:   Mouth/Throat: No oropharyngeal exudate.   Posterior oropharynx erythematous with post nasal drip present  Maxillary sinuses TTP   Eyes:  Conjunctivae are normal. Pupils are equal, round, and reactive to light.   Cardiovascular: Normal rate and regular rhythm.    Pulmonary/Chest: Effort normal and breath sounds normal. She has no wheezes.   Musculoskeletal: She exhibits no edema.   Lymphadenopathy:     She has no cervical adenopathy.   Neurological: She is alert.   Skin: No erythema.   Psychiatric: Her behavior is normal.       Assessment:       1. Cough    2. Acute non-recurrent maxillary sinusitis        Plan:     Michelle was seen today for cough.    Diagnoses and all orders for this visit:    Cough  -     benzonatate (TESSALON) 100 MG capsule; Take 1 capsule (100 mg total) by mouth 3 (three) times daily as needed for Cough.    Acute non-recurrent maxillary sinusitis  -     amoxicillin-clavulanate 875-125mg (AUGMENTIN) 875-125 mg per tablet; Take 1 tablet by mouth 2 (two) times daily.    Take antibiotics with food.  Increase fluid intake.  Call the clinic if symptoms worsen, new symptoms develop or if you are not any better after completion of your antibiotics.    If no improvement after antibiotics completed, contact clinic so CXR can be ordered.

## 2017-08-17 ENCOUNTER — TELEPHONE (OUTPATIENT)
Dept: FAMILY MEDICINE | Facility: CLINIC | Age: 77
End: 2017-08-17

## 2017-08-17 NOTE — TELEPHONE ENCOUNTER
----- Message from Bonita Martinez sent at 8/17/2017  3:54 PM CDT -----  Contact: Daughter Soraida Flores [phone]  She fell at home and now has a large hematoma on her forearm.  She would like to bring her mom in for an xray to be sure it is OK.  Please call her.  Thank you!

## 2017-08-18 ENCOUNTER — TELEPHONE (OUTPATIENT)
Dept: FAMILY MEDICINE | Facility: CLINIC | Age: 77
End: 2017-08-18

## 2017-08-18 ENCOUNTER — HOSPITAL ENCOUNTER (OUTPATIENT)
Dept: RADIOLOGY | Facility: CLINIC | Age: 77
Discharge: HOME | End: 2017-08-18
Attending: PHYSICIAN ASSISTANT
Payer: MEDICARE

## 2017-08-18 ENCOUNTER — OFFICE VISIT (OUTPATIENT)
Dept: FAMILY MEDICINE | Facility: CLINIC | Age: 77
End: 2017-08-18
Payer: MEDICARE

## 2017-08-18 ENCOUNTER — DOCUMENTATION ONLY (OUTPATIENT)
Dept: FAMILY MEDICINE | Facility: CLINIC | Age: 77
End: 2017-08-18

## 2017-08-18 VITALS
WEIGHT: 192.44 LBS | BODY MASS INDEX: 37.78 KG/M2 | SYSTOLIC BLOOD PRESSURE: 138 MMHG | TEMPERATURE: 98 F | DIASTOLIC BLOOD PRESSURE: 88 MMHG | HEART RATE: 71 BPM | HEIGHT: 60 IN | OXYGEN SATURATION: 94 %

## 2017-08-18 DIAGNOSIS — T14.8XXA HEMATOMA: ICD-10-CM
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
## 2017-08-18 DIAGNOSIS — M79.601 PAIN OF RIGHT UPPER EXTREMITY: ICD-10-CM

## 2017-08-18 DIAGNOSIS — R06.2 WHEEZING: ICD-10-CM

## 2017-08-18 DIAGNOSIS — J45.909 ASTHMA, NOT WELL CONTROLLED, UNSPECIFIED ASTHMA SEVERITY, UNCOMPLICATED: Primary | ICD-10-CM

## 2017-08-18 DIAGNOSIS — R05.9 COUGH: ICD-10-CM

## 2017-08-18 PROCEDURE — 71020 XR CHEST PA AND LATERAL: CPT | Mod: TC,PO

## 2017-08-18 PROCEDURE — 3075F SYST BP GE 130 - 139MM HG: CPT | Mod: ,,, | Performed by: PHYSICIAN ASSISTANT

## 2017-08-18 PROCEDURE — 73090 X-RAY EXAM OF FOREARM: CPT | Mod: 26,RT,S$GLB, | Performed by: RADIOLOGY

## 2017-08-18 PROCEDURE — 73110 X-RAY EXAM OF WRIST: CPT | Mod: 26,RT,S$GLB, | Performed by: RADIOLOGY

## 2017-08-18 PROCEDURE — 3079F DIAST BP 80-89 MM HG: CPT | Mod: ,,, | Performed by: PHYSICIAN ASSISTANT

## 2017-08-18 PROCEDURE — 73110 X-RAY EXAM OF WRIST: CPT | Mod: TC,PO,RT

## 2017-08-18 PROCEDURE — 1159F MED LIST DOCD IN RCRD: CPT | Mod: ,,, | Performed by: PHYSICIAN ASSISTANT

## 2017-08-18 PROCEDURE — 71020 XR CHEST PA AND LATERAL: CPT | Mod: 26,,, | Performed by: RADIOLOGY

## 2017-08-18 PROCEDURE — 99999 PR PBB SHADOW E&M-EST. PATIENT-LVL III: CPT | Mod: PBBFAC,,, | Performed by: PHYSICIAN ASSISTANT

## 2017-08-18 PROCEDURE — 73090 X-RAY EXAM OF FOREARM: CPT | Mod: TC,PO,RT

## 2017-08-18 PROCEDURE — 99213 OFFICE O/P EST LOW 20 MIN: CPT | Mod: PBBFAC,PO,25 | Performed by: PHYSICIAN ASSISTANT

## 2017-08-18 PROCEDURE — 1126F AMNT PAIN NOTED NONE PRSNT: CPT | Mod: ,,, | Performed by: PHYSICIAN ASSISTANT

## 2017-08-18 PROCEDURE — 1157F ADVNC CARE PLAN IN RCRD: CPT | Mod: ,,, | Performed by: PHYSICIAN ASSISTANT

## 2017-08-18 PROCEDURE — 99214 OFFICE O/P EST MOD 30 MIN: CPT | Mod: S$PBB,,, | Performed by: PHYSICIAN ASSISTANT

## 2017-08-18 RX ORDER — PREDNISONE 10 MG/1
10 TABLET ORAL 2 TIMES DAILY
Qty: 10 TABLET | Refills: 0 | Status: SHIPPED | OUTPATIENT
Start: 2017-08-18 | End: 2017-08-23

## 2017-08-18 RX ORDER — LEVALBUTEROL INHALATION SOLUTION 1.25 MG/3ML
SOLUTION RESPIRATORY (INHALATION)
Qty: 1584 ML | Refills: 0 | Status: SHIPPED | OUTPATIENT
Start: 2017-08-18

## 2017-08-18 RX ORDER — LEVALBUTEROL INHALATION SOLUTION 1.25 MG/3ML
1 SOLUTION RESPIRATORY (INHALATION) EVERY 4 HOURS PRN
Qty: 120 ML | Refills: 0 | Status: SHIPPED | OUTPATIENT
Start: 2017-08-18 | End: 2017-08-18 | Stop reason: SDUPTHER

## 2017-08-18 RX ORDER — LEVALBUTEROL 1.25 MG/.5ML
1.25 SOLUTION, CONCENTRATE RESPIRATORY (INHALATION)
Status: COMPLETED | OUTPATIENT
Start: 2017-08-18 | End: 2017-08-18

## 2017-08-18 RX ADMIN — LEVALBUTEROL 1.25 MG: 1.25 SOLUTION, CONCENTRATE RESPIRATORY (INHALATION) at 02:08

## 2017-08-18 NOTE — TELEPHONE ENCOUNTER
----- Message from Juliana Bernal sent at 8/18/2017  7:18 AM CDT -----  Contact: Pt's sister   Patient's sister called and states the patient fell yesterday she hurt her Right wrist and Elbow the patient is on Blood thinners the sister took her to [facility] yesterday they were told it would be hours before they would be able to see the patient, they left without being seen.

## 2017-08-18 NOTE — PATIENT INSTRUCTIONS
Hematoma  A hematoma is a collection of blood trapped outside of a blood vessel. It is what we think of as a bruise or a contusion. It is usually seen under the skin as a black and blue spot on your arm or leg, or a bump on your head after an injury. It can be almost anywhere on or in your body. It can also occur in an internal organ where it can be more serious.  A hematoma is caused by an injury with damage to small blood vessels. This causes blood to leak into the tissues. Blood forms a pocket under the skin that swells and looks like a purplish patch.  Gradually the blood in the hematoma is absorbed back into the body. The swelling and pain of the hematoma will go away. This takes from 1 to 4 weeks, depending on the size of the hematoma. The skin over the hematoma may turn bluish then brown and yellow as the blood is dissolved and absorbed. Usually, this only takes a couple of weeks but can last months.  Home care  · Limit motion of the joints near the hematoma. If the hematoma is large and painful, avoid sports and other vigorous physical activity until the swelling and pain goes away.  · Apply an ice pack (ice cubes in a plastic bag, wrapped in a thin towel or a frozen bag of peas) over the injured area for 20 minutes every 1 to 2 hours the first day. Continue with ice packs 3 to 4 times a day for the next 2 days. Continue the use of ice packs for relief of pain and swelling as needed.  · If you need anything for pain, you can take acetaminophen or ibuprofen, unless you were given a different pain medicine to use. Talk with your doctor before using these medicines if you have chronic liver or kidney disease, or ever had a stomach ulcer or gastrointestinal bleeding, or are taking blood thinner medicines.  Follow-up care  Follow up with your healthcare provider, or as advised. If X-rays or a CT scan were done, you will be notified if there is a change in the reading, especially if it affects treatment.  When  to seek medical advice  Call your healthcare provider right away f any of the following occur:  · Redness around the hematoma  · Increase in pain or warmth in the hematoma  · Increase in size of the hematoma  · Fever of 100.4ºF (38ºC) or higher, or as directed by your healthcare provider  · If the hematoma is on the arm or leg, watch for:  ¨ Increased swelling or pain in the extremity  ¨ Numbness or tingling or blue color of the hand or foot  Date Last Reviewed: 11/5/2015 © 2000-2016 [employer]. [address] [place] [address]. All rights reserved. This information is not intended as a substitute for professional medical care. Always follow your healthcare professional's instructions.

## 2017-08-18 NOTE — PROGRESS NOTES
Subjective:       Patient ID: Michelle Kovacs is a 77 y.o. female.    Chief Complaint: Fall    Patient with history of dementia and poorly controlled asthma presents accompanied by her daughter who states that patient had a mechanical fall at home yesterday.  The patient now has a large contusion of the right forearm.  She is complaining of pain on the forearm.  They have tried nothing for the pain or hematoma.  Her anticoagulants are being held since yesterday because they were concerned about internal bleeding in the arm   Patient was seen 2 weeks ago for sinusitis.  She was treated with oral antibiotics.  She continues to cough and have wheezing.  She has not had a fever.  Her appetite and energy level are normal.       Review of Systems   Constitutional: Negative for appetite change, fatigue and unexpected weight change.   Respiratory: Positive for cough and wheezing. Negative for shortness of breath.    Musculoskeletal: Positive for myalgias.   Skin: Positive for wound.       Objective:      Physical Exam   Constitutional: She appears well-developed and well-nourished. No distress.   HENT:   Head: Normocephalic and atraumatic.   Eyes: Conjunctivae and EOM are normal. Pupils are equal, round, and reactive to light.   Cardiovascular: Normal rate, regular rhythm and normal heart sounds.    Pulmonary/Chest: Effort normal. She has decreased breath sounds. She has wheezes (diffuse scattered wheezes ). She has no rhonchi. She has no rales.   Musculoskeletal:        Right wrist: She exhibits normal range of motion and no tenderness.        Right forearm: She exhibits tenderness and swelling. She exhibits no deformity.        Right hand: She exhibits normal range of motion, no tenderness, normal capillary refill and no swelling.        Right lower leg: She exhibits no edema.        Left lower leg: She exhibits no edema.   Neurological: She is alert. She has normal strength. No cranial nerve deficit.  Coordination and gait normal.   Skin: Ecchymosis (right forarm ) noted.   Large hematoma right forearm    Vitals reviewed.      Assessment:       1. Asthma, not well controlled, unspecified asthma severity, uncomplicated    2. Cough    3. Wheezing    4. Hematoma    5. Fall, initial encounter    6. Pain of right upper extremity        Plan:       Michelle was seen today for fall.    Diagnoses and all orders for this visit:    Asthma, not well controlled, unspecified asthma severity, uncomplicated  -     NEBULIZER FOR HOME USE    -     levalbuterol nebulizer solution 1.25 mg; Take 0.5 mLs (1.25 mg total) by nebulization one time.  After treatment clinic was much improved with decreased wheezing and better air movement     -     predniSONE (DELTASONE) 10 MG tablet; Take 1 tablet (10 mg total) by mouth 2 (two) times daily.  -     levalbuterol (XOPENEX) 1.25 mg/3 mL nebulizer solution; Take 3 mLs (1.25 mg total) by nebulization every 4 (four) hours as needed for Wheezing. Rescue  Cough  -     X-Ray Chest PA And Lateral; Future    Wheezing  -     X-Ray Chest PA And Lateral; Future          Hematoma  -     X-Ray Forearm Right; Future  -     X-Ray Wrist Complete Right; Future    Fall, initial encounter    Pain of right upper extremity  -     X-Ray Forearm Right; Future  -     X-Ray Wrist Complete Right; Future  Michelle Kovacs was given a handout which discussed their disease process, precautions, and instructions for follow-up and therapy.

## 2017-08-18 NOTE — PROGRESS NOTES
Pre-Visit Chart Review  For Appointment Scheduled on 08/18/2017    Health Maintenance Due   Topic Date Due    TETANUS VACCINE  02/11/1958    DEXA SCAN  02/11/1979    Zoster Vaccine  02/11/1999    Pneumococcal (65+) (2 of 2 - PCV13) 12/08/2016    Influenza Vaccine  08/01/2017

## 2017-08-21 ENCOUNTER — TELEPHONE (OUTPATIENT)
Dept: FAMILY MEDICINE | Facility: CLINIC | Age: 77
End: 2017-08-21

## 2017-08-21 NOTE — TELEPHONE ENCOUNTER
----- Message from RUFINA Ibarra sent at 8/18/2017  3:40 PM CDT -----  xrays show soft tissue swelling but no fractures   Continue with instruction printed out at visit    Chest xray normal  Continue with prednisone and nebs

## 2017-08-21 NOTE — TELEPHONE ENCOUNTER
----- Message from Kelsey Mccoy sent at 8/21/2017  2:34 PM CDT -----  Please call [phone] ... Returning call for results

## 2017-08-28 ENCOUNTER — TELEPHONE (OUTPATIENT)
Dept: FAMILY MEDICINE | Facility: CLINIC | Age: 77
End: 2017-08-28

## 2017-08-29 NOTE — TELEPHONE ENCOUNTER
PA Levalbuterol denied by insurance. Daughter, Soraida notified. Says patient is doing better for now. Will call to change medication when needed.

## 2017-09-05 NOTE — TELEPHONE ENCOUNTER
Letter says it was denied by her Part D coverage but may be covered under her Part B coverage. Have they checked this out first?

## 2017-11-01 DIAGNOSIS — I10 ESSENTIAL HYPERTENSION: ICD-10-CM

## 2017-11-02 RX ORDER — AMLODIPINE BESYLATE 5 MG/1
TABLET ORAL
Qty: 90 TABLET | Refills: 3 | Status: SHIPPED | OUTPATIENT
Start: 2017-11-02

## 2018-02-23 ENCOUNTER — DOCUMENTATION ONLY (OUTPATIENT)
Dept: FAMILY MEDICINE | Facility: CLINIC | Age: 78
End: 2018-02-23

## 2018-02-23 NOTE — PROGRESS NOTES
Pre-Visit Chart Review  For Appointment Scheduled on (date) 3/5/18    Health Maintenance Due   Topic Date Due    TETANUS VACCINE  02/11/1957    DEXA SCAN  02/11/1979    Zoster Vaccine  02/11/1999    Pneumococcal (65+) (2 of 2 - PCV13) 12/08/2016    Influenza Vaccine  08/01/2017